# Patient Record
Sex: FEMALE | Race: WHITE
[De-identification: names, ages, dates, MRNs, and addresses within clinical notes are randomized per-mention and may not be internally consistent; named-entity substitution may affect disease eponyms.]

---

## 2019-03-01 ENCOUNTER — HOSPITAL ENCOUNTER (OUTPATIENT)
Dept: HOSPITAL 75 - RAD | Age: 44
End: 2019-03-01
Attending: NURSE PRACTITIONER
Payer: OTHER GOVERNMENT

## 2019-03-01 DIAGNOSIS — Z12.31: Primary | ICD-10-CM

## 2019-03-01 PROCEDURE — 77067 SCR MAMMO BI INCL CAD: CPT

## 2019-03-05 NOTE — DIAGNOSTIC IMAGING REPORT
INDICATION: Routine screening.



Comparison is made with prior mammogram from 12/28/2017.



2-D and 3-D bilateral screening mammography was performed with

CAD.



The current study was also evaluated with a Computer Aided

Detection (CAD) system.



FINDINGS: Both breasts are heterogeneously dense, limiting the

sensitivity of mammography. Bilateral breast implants are again

noted. Implant contours remain stable. No spiculated mass or

malignant-appearing microcalcifications are seen. The axillae are

unremarkable.



IMPRESSION: No mammographic features suspicious for malignancy

are identified.



ACR BI-RADS Category 2: Benign findings.

Result letter will be mailed to the patient.

Note: At least 10% of breast cancer is not imaged by mammography.



Dictated by: 



  Dictated on workstation # [id number]

## 2019-04-23 ENCOUNTER — HOSPITAL ENCOUNTER (EMERGENCY)
Dept: HOSPITAL 75 - ER FS | Age: 44
Discharge: HOME | End: 2019-04-23
Payer: OTHER GOVERNMENT

## 2019-04-23 VITALS — HEIGHT: 63 IN | BODY MASS INDEX: 23.04 KG/M2 | WEIGHT: 130 LBS

## 2019-04-23 VITALS — DIASTOLIC BLOOD PRESSURE: 72 MMHG | SYSTOLIC BLOOD PRESSURE: 134 MMHG

## 2019-04-23 DIAGNOSIS — Z87.19: ICD-10-CM

## 2019-04-23 DIAGNOSIS — Z79.51: ICD-10-CM

## 2019-04-23 DIAGNOSIS — K64.8: Primary | ICD-10-CM

## 2019-04-23 PROCEDURE — 45020 DRAINAGE OF RECTAL ABSCESS: CPT

## 2019-04-23 NOTE — ED GENERAL
General


Stated Complaint:  EXTERNAL HEMRRHOID NEEDING DRAINED





History of Present Illness


Date Seen by Provider:  Apr 23, 2019


Time Seen by Provider:  06:49


Initial Comments


Patient is a 43 y/o female who presents to the ER this morning requesting help 

with a hemorrhoid.  She has hx of chronic constipation and has had intermittent 

hemorrhoids.  Today she states that one became flared and painful over the last 

24 hours.  Has not previously required thrombectomy in the past.  No fever or 

chills.  She does not take medications for constipation despite that she 

endorses this as a chronic problem.





Allergies and Home Medications


Allergies


Coded Allergies:  


     No Known Drug Allergies (Unverified , 4/23/19)





Home Medications


Docusate Sodium 100 Mg Capsule, 100 MG PO BID PRN for constipation


   Prescribed by: LUZ MORALES on 4/23/19 0707


Hydrocortisone/Pramoxine 30 Gm Cream.appl, 30 GM RC BID


   Prescribed by: LUZ MORALES on 4/23/19 0709


Polyethylene Glycol 3350 17 Gm Powd.pack, 17 GM PO DAILY


   Prescribed by: LUZ MORALES on 4/23/19 0709


Witch Hazel 1 Each Med..pad, 1 EACH TP BID


   Prescribed by: LUZ MORALES on 4/23/19 0710





Patient Home Medication List


Home Medication List Reviewed:  Yes





Review of Systems


Review of Systems


Constitutional:  no symptoms reported


EENTM:  no symptoms reported


Respiratory:  no symptoms reported


Cardiovascular:  no symptoms reported


Genitourinary:  no symptoms reported


Musculoskeletal:  no symptoms reported


Skin:  no symptoms reported





Past Medical-Social-Family Hx


Patient Social History


Recent Foreign Travel:  No


Contact w/Someone Who Travel:  No





Physical Exam


Vital Signs


Capillary Refill :


Height, Weight, BMI


Height: '"


Weight: lbs. oz. kg;  BMI


Method:


General Appearance:  No Apparent Distress, WD/WN


Neck:  Full Range of Motion


Respiratory:  Lungs Clear, Normal Breath Sounds


Cardiovascular:  Regular Rate, Rhythm


Gastrointestinal:  Non Tender, Soft


Rectal:  Other (multiple soft hemorrhoids that are not inflamed.  single 

external thrombosed hemorrhoid is present.)


Back:  Normal Inspection


Extremity:  Normal Capillary Refill





Progress/Results/Core Measures


Suspected Sepsis


SIRS


Temperature: 


Pulse:  


Respiratory Rate: 


 


Blood Pressure  / 


Mean:





Results/Orders


My Orders





Orders - LUZ MORALES DO


Lidocaine/Epi 1% 1:100,000 (Xylocaine /E (4/23/19 07:00)


Lidocaine/Epi 2% 1:100,000 (Xylocaine/Ep (4/23/19 07:03)





Medications Given in ED





Current Medications








 Medications  Dose


 Ordered  Sig/Gabriella


 Route  Start Time


 Stop Time Status Last Admin


Dose Admin


 


 Lidocaine/


 Epinephrine  20 ml  STK-MED ONCE


 .ROUTE  4/23/19 07:03


 4/23/19 07:07 DC 4/23/19 07:10


20 ML








Vital Signs/I&O


Capillary Refill :


Progress Note :  


   Time:  07:04


Progress Note


Patient is seen and examined.  I discussed options for treatment of her 

thrombosed hemorrhoid including watchful waiting with pain medications vs 

thrombectomy.  Patient prefers to proceed with thrombectomy.  Symptoms less 

than 24 hours so procedure is indicated.





07:15:  Procedure note.  Female RN accompanying the procedure.  Hemorrhoid is 

cleansed with betadine.  Local anesthesia is provided with 2% lidocaine with 

epinephrine.  #15 blade scalpel used to make small elliptical incision.  small 

amount of clot tissue is removed from the hemorrhoid.  Following this, it was 

irrigated with normal saline and a few additional smaller clot fragments were 

washed out.  Wound was hemostatic.  Total length of wound was 0.5 cm.  Wound 

was hemostatic following the procedure and hemorrhoid was soft.





07:25:  Patient discharged to home with Rx for miralax, docusate, analpram 2.5%

, and Tucks pads.  Constipation control discussed with patient.  All of her 

questions answered prior to discharge.  Patient had adequate pain control 

during the procedure.





Departure


Impression





 Primary Impression:  


 Hemorrhoids


Disposition:  01 HOME, SELF-CARE


Condition:  Improved





Departure-Patient Inst.


Referrals:  


ELI CRUZ MD (PCP/Family)


Primary Care Physician


Scripts


Witch Hazel (Tucks) 1 Each Med..pad


1 EACH TP BID for Constipation for 30 Days, #60 PKT 1 Refill


   Prov: LUZ MORALES DO         4/23/19 


Hydrocortisone/Pramoxine (Analpram Hc 2.5% Cream) 30 Gm Cream.appl


30 GM RC BID for Hemorrhoids, #1 APPLIC 1 Refill


   Prov: LUZ MORALES DO         4/23/19 


Polyethylene Glycol 3350 (Miralax) 17 Gm Powd.pack


17 GM PO DAILY for constipation for 30 Days, #30 EACH 1 Refill


   Prov: LUZ MORALES DO         4/23/19 


Docusate Sodium (Docusate Sodium) 100 Mg Capsule


100 MG PO BID PRN for constipation for 30 Days, #60 CAP 1 Refill


   Prov: LUZ MORALES DO         4/23/19











LUZ MORALES DO Apr 23, 2019 06:50

## 2019-10-01 ENCOUNTER — HOSPITAL ENCOUNTER (OUTPATIENT)
Dept: HOSPITAL 75 - RAD FS | Age: 44
End: 2019-10-01
Attending: NURSE PRACTITIONER
Payer: OTHER GOVERNMENT
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
## 2019-10-01 DIAGNOSIS — S69.92XA: Primary | ICD-10-CM

## 2019-10-01 PROCEDURE — 73140 X-RAY EXAM OF FINGER(S): CPT

## 2019-10-01 NOTE — DIAGNOSTIC IMAGING REPORT
INDICATION:  Left ring finger pain.  Injury when dog leash

twisted ring finger.  Swelling.



TECHNIQUE:  

Single view hand with 3 views left ring finger, 10:36 AM.



CORRELATION STUDY: 

None



FINDINGS:  

Two screws of the 4th metacarpal head are present.  The ring

remains over the base of ring finger obscuring evaluation of

proximal phalanx.  The visualized osseous structures of the 4th

finger demonstrate no acute fracture.  Alignment anatomic.  Joint

space maintained.  Remaining osseous structures of the hand

otherwise intact and unremarkable.



IMPRESSION:

1. Negative for acute bony abnormality about the left ring finger

and/or hand.



Dictated by: 



  Dictated on workstation # [id number]

## 2020-03-13 ENCOUNTER — HOSPITAL ENCOUNTER (OUTPATIENT)
Dept: HOSPITAL 75 - RAD | Age: 45
End: 2020-03-13
Attending: NURSE PRACTITIONER
Payer: OTHER GOVERNMENT

## 2020-03-13 DIAGNOSIS — F41.9: ICD-10-CM

## 2020-03-13 DIAGNOSIS — G47.00: ICD-10-CM

## 2020-03-13 DIAGNOSIS — N39.3: ICD-10-CM

## 2020-03-13 DIAGNOSIS — G43.719: ICD-10-CM

## 2020-03-13 DIAGNOSIS — M75.52: ICD-10-CM

## 2020-03-13 DIAGNOSIS — G47.419: ICD-10-CM

## 2020-03-13 DIAGNOSIS — Z12.31: Primary | ICD-10-CM

## 2020-03-13 DIAGNOSIS — M79.673: ICD-10-CM

## 2020-03-13 PROCEDURE — 77067 SCR MAMMO BI INCL CAD: CPT

## 2020-03-13 NOTE — DIAGNOSTIC IMAGING REPORT
INDICATION: 

Routine screening.



COMPARISON:   

03/06/2019 and 12/28/2017.



TECHNIQUE: 

2D and 3D bilateral screening mammography was performed with CAD.



FINDINGS:

Bilateral subpectoral breast implants are again noted. The

implant contours remain smooth. Scattered fibroglandular

densities throughout both breasts are noted. Tiny nodular

densities in the inferior left breast just medial to the nipple

line at posterior depth are stable. No dominant mass or malignant

appearing microcalcifications are seen. The axillae are

unremarkable.



IMPRESSION: 

No mammographic features suspicious for malignancy are

identified.



ACR BI-RADS Category 2: Benign findings.

Result letter will be mailed to the patient.

Note: At least 10% of breast cancer is not imaged by mammography.



Dictated by: 



  Dictated on workstation # [id number]